# Patient Record
Sex: FEMALE | URBAN - METROPOLITAN AREA
[De-identification: names, ages, dates, MRNs, and addresses within clinical notes are randomized per-mention and may not be internally consistent; named-entity substitution may affect disease eponyms.]

---

## 2022-02-17 ENCOUNTER — TELEPHONE (OUTPATIENT)
Dept: GASTROENTEROLOGY | Facility: CLINIC | Age: 87
End: 2022-02-17

## 2022-02-17 NOTE — TELEPHONE ENCOUNTER
RN called patient's son for more details. Patient in background discussing case. Only one positive case this month. Diarrhea for years but unsure if patient was tested for CDiff in the past.  Vancomycin given and ended a few days ago, diarrhea has not improved. Patient reports not any better while on the vancomycin treatment.     Patient was on antibiotics for cellulitis prior to positive CDiff. Many hospitalizations for bones breaking, etc. Son reports that this may have disrupted her microbiota. RN explained that we would need her records but the criteria is 3 infections in the past year. RN recommended he go to patient's provider and ask for another stool test to determine if CDiff positive.     Patient lives in [place]. Reports he doesn't think his mom can go through colonoscopy and would want the pill form. RN explained that we cannot schedule right now as she does not meet criteria. Son offered to send records. RN gave fax number for their provider to send records.    Aicha Poole RN, BSN  Gastroenterology Nurse Care Coordinator  Phone: [phone]  Fax: [phone]

## 2022-02-17 NOTE — TELEPHONE ENCOUNTER
[facility] Call Center    Phone Message    May a detailed message be left on voicemail: yes     Reason for Call: Other:     Keith in calling to inquire about medications and treatments for CDiff.  Please call to discuss    Action Taken: Message routed to:  Adult Clinics: Gastroenterology (GI) p [id number]    Travel Screening: Not Applicable

## 2022-02-22 NOTE — TELEPHONE ENCOUNTER
"RN reached out to patient's son. Patient actually did not have CDiff. 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 She does have chronic diarrhea but not CDiff. Son states \"maybe it is a good thing that she doesn't have CDiff\". RN states it is good not to have CDiff for sure. Patient trying different home remedies. He states \"if she has CDiff, we know who to contact\". Reports does not need IMT at this time.    RN closing encounter at this time.    Aicha Poole RN, BSN  Gastroenterology Nurse Care Coordinator  Phone: [phone]  Fax: [phone]    "